# Patient Record
(demographics unavailable — no encounter records)

---

## 2025-04-15 NOTE — ASSESSMENT
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 At dx she saw neurosurgeon Dr. Dwight Garcia at [facility] who recommended follow- up imaging with contrast which she had done Sept 2024 and endorses was told to follow with 6 month MRI. She has since relocated to [place] and is 10 weeks pregnant. Her OBGYN referred her to neurosurgery for clearance prior to epidural/ birth planning.   Will order MRI brain without contrast to get updated MRI prior to neurosurgical clearance.   The patient's questions were answered.  The patient demonstrated an excellent understanding of todays discussion and next steps in treatment plan.

## 2025-04-15 NOTE — PHYSICAL EXAM
[General Appearance - Alert] : alert [General Appearance - In No Acute Distress] : in no acute distress [General Appearance - Well-Appearing] : healthy appearing [Oriented To Time, Place, And Person] : oriented to person, place, and time [Impaired Insight] : insight and judgment were intact [Affect] : the affect was normal [Memory Recent] : recent memory was not impaired [Motor Tone] : muscle tone was normal in all four extremities [Motor Strength] : muscle strength was normal in all four extremities [Sensation Tactile Decrease] : light touch was intact [FreeTextEntry5] : CN II-XII grossly intact  [Sclera] : the sclera and conjunctiva were normal [Neck Appearance] : the appearance of the neck was normal [] : no respiratory distress [Respiration, Rhythm And Depth] : normal respiratory rhythm and effort [Abnormal Walk] : normal gait [Skin Color & Pigmentation] : normal skin color and pigmentation

## 2025-04-15 NOTE — HISTORY OF PRESENT ILLNESS
[de-identified] : 32 y/o female with no significant PMHx, who was dx with pineal gland cyst last year during elective full body imaging workup who presents for neurosurgical clearance, referred by her OBGYN.   Pt endorses she was dx with pineal gland cyst last Summer 2024 during elective full body scan that she had done due to some fatigue/headaches. She saw neurosurgeon Dr. Dwight Garcia at [facility] when dx who recommended follow- up imaging with contrast which she had done Sept 2024 and endorses was told to follow with 6 month MRI.   She has since relocated to [place] and is 10 weeks pregnant. Her OBGYN referred her to neurosurgery for clearance prior to epidural/ birth planning.  She endorses some headaches that come and go, but denies other neurological complaints.   Neurosurgery: Dr. Dwight Garcia @ [facility]

## 2025-04-15 NOTE — HISTORY OF PRESENT ILLNESS
[de-identified] : 32 y/o female with no significant PMHx, who was dx with pineal gland cyst last year during elective full body imaging workup who presents for neurosurgical clearance, referred by her OBGYN.   Pt endorses she was dx with pineal gland cyst last Summer 2024 during elective full body scan that she had done due to some fatigue/headaches. She saw neurosurgeon Dr. Dwight Garcia at [facility] when dx who recommended follow- up imaging with contrast which she had done Sept 2024 and endorses was told to follow with 6 month MRI.   She has since relocated to [place] and is 10 weeks pregnant. Her OBGYN referred her to neurosurgery for clearance prior to epidural/ birth planning.  She endorses some headaches that come and go, but denies other neurological complaints.   Neurosurgery: Dr. Dwight Garcia @ [facility]

## 2025-04-15 NOTE — ASSESSMENT
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 At dx she saw neurosurgeon Dr. Dwight Garcia at [facility] who recommended follow- up imaging with contrast which she had done Sept 2024 and endorses was told to follow with 6 month MRI. She has since relocated to [place] and is 10 weeks pregnant. Her OBGYN referred her to neurosurgery for clearance prior to epidural/ birth planning.   Will order MRI brain without contrast to get updated MRI prior to neurosurgical clearance.   The patient's questions were answered.  The patient demonstrated an excellent understanding of todays discussion and next steps in treatment plan.